# Patient Record
Sex: MALE | Race: OTHER
[De-identification: names, ages, dates, MRNs, and addresses within clinical notes are randomized per-mention and may not be internally consistent; named-entity substitution may affect disease eponyms.]

---

## 2018-07-17 ENCOUNTER — HOSPITAL ENCOUNTER (OUTPATIENT)
Dept: HOSPITAL 88 - RAD | Age: 53
End: 2018-07-17
Attending: INTERNAL MEDICINE
Payer: COMMERCIAL

## 2018-07-17 DIAGNOSIS — M25.511: Primary | ICD-10-CM

## 2023-01-04 NOTE — DIAGNOSTIC IMAGING REPORT
PROCEDURE:X-RAY RIGHT SHOULDER, COMPLETE

 

COMPARISON:None.

 

INDICATIONS:RIGHT SHOULDER PAIN

 

FINDINGS:

Normal mineralization. No acute fracture or dislocation. Mild 

degenerative changes of the right glenohumeral joint. Moderate 

degenerative changes of the right AC joint with joint space narrowing 

and bony osteophyte formation.  

 

A 7 mm dense nodular opacity is noted overlying the right mid lung. 

 

CONCLUSION: 

 

 

No acute osseous abnormality. Mild right glenohumeral and moderate 

right AC joint osteoarthritis. 

 

Nodular opacity projecting over the right mid lung zone which may 

represent a lung nodule or sclerotic rib lesion. Dedicated chest 

radiograph is recommended for further evaluation. 

 

Dictated by:  CAITLYN PLASCENCIA M.D. on 7/17/2018 at 12:27     

Electronically approved by:  CAITLYN PLASCENCIA M.D. on 7/17/2018 at 12:27 Spoke with the patient gave him his arrival time.